# Patient Record
Sex: MALE | Race: WHITE | NOT HISPANIC OR LATINO | Employment: OTHER | URBAN - METROPOLITAN AREA
[De-identification: names, ages, dates, MRNs, and addresses within clinical notes are randomized per-mention and may not be internally consistent; named-entity substitution may affect disease eponyms.]

---

## 2021-05-21 ENCOUNTER — TELEPHONE (OUTPATIENT)
Dept: PULMONOLOGY | Facility: CLINIC | Age: 86
End: 2021-05-21

## 2021-05-21 DIAGNOSIS — J61 ASBESTOSIS (H): Primary | ICD-10-CM

## 2021-05-21 NOTE — TELEPHONE ENCOUNTER
Spoke with patient about scheduling full pulmonary function test and chest xray before visit with Dr. Arana in July. Details confirmed.

## 2021-05-21 NOTE — TELEPHONE ENCOUNTER
RECORDS RECEIVED FROM: internal    DATE RECEIVED: 7.19.21    NOTES STATUS DETAILS   OFFICE NOTE from referring provider na Self referred    OFFICE NOTE from other specialist ce [facility]- 3.18.21 Da   9.9.20 [facility]      DISCHARGE SUMMARY from hospital na    DISCHARGE REPORT from the ER na    MEDICATION LIST na    IMAGING  (NEED IMAGES AND REPORTS)     CT SCAN ce [facility]- 8.18.20, 12.13.19  PET- 2.13.20    CHEST XRAY (CXR) CE/In process   [facility]- 12.13.19    TESTS     PULMONARY FUNCTION TESTING (PFT) In process         Action [id number]   Action Taken Image requets sent to [facility] for   CXR- 12.13.19   CT-  8.18.20, 12.13.19  PET- 2.13.20     Message sent to Nurse pool to place CXR and PFT orders     Records/images request sent to -  [facility] [place]-- --per fax received, no records at -[facility] in [place]--

## 2021-07-19 ENCOUNTER — PRE VISIT (OUTPATIENT)
Dept: PULMONOLOGY | Facility: CLINIC | Age: 86
End: 2021-07-19